# Patient Record
(demographics unavailable — no encounter records)

---

## 2024-11-12 NOTE — HISTORY OF PRESENT ILLNESS
[FreeTextEntry1] : Ms Camarena is a 58 yo female with lower back pain. Patient has a history of dull, achy, sharp thoracic and lumbar pain for two years but recently returning. Mid back pain radiates to buttock and lower extremities b/l. Patient notes heaviness of the lower extremities. The pain is worse when standing, walking, and sitting. She treats pan with Tylenol. Pt denies bowel/bladder incontinence, weakness, falls, unsteadiness.  4/6/23 Today the patient is status post (L4-5) lumbar epidural steroid injection on 3/24/23 epidural injection with significant relief and 50% improvement. The patient states the heaviness and radicular pain in legs has lessened along with her low back pain.  The patient states in the morning the pain in her low back is at its worse and rates it a 8/10 on the pain scale. She describes the pain as sharp and states the pain does radiates anterior of both thighs.  TODAY 04-: Patient presents to the office today for a follow up visit, she is most recently status post a Lumbar interlaminar (L4-5) epidural steroid injection under fluoroscopic guidance on 03/24/23 which provided her with about 60% sustained relief for about 8 months  Today she presents with continued lower/mid back pain which is intermittent in nature with symptoms worsening in the morning to both buttocks. The pain is worse when standing, walking, and sitting. She manages her pain with Tylenol. She rates her pain an 8/10 on the pain scale in the morning with symptoms improving throughout the day. She notes she cannot take anti-inflammatories due to her stomach issues. We discussed alternative treatment options; she would like to proceed with injection therapy at this time. Patient denies any bowel or bladder dysfunction, incontinence, or saddle anesthesia.  TODAY (11/12/24) revisit encounter Patient is a 60-year-old female is here accompanied by her , she was last seen in April 2024, patient states that she has not been able to come to the office because she was away at [facility] for the past 6-month. She is here today for having bilateral lower back pain that radiates into her bilateral buttock area and anterior lateral thigh patient states that the pain is worse when she walks or stands for long period of time as well as when she leans forward to pick things up or carrying heavy things.  Patient states that she was taking care of her elderly mother and she had severe pain when she was giving her mom a bath, or having to help carry her mom. Pain is 8/10.  Patient also admits to having axial back pain around the waistband, along with stiffness/pain in the lower back with any sort of rotational movements and hyperextension of the spine. Patient also notes significant pain with getting up from a seated position as well as standing or walking for extended periods of time.  I advised patient that she has a diffuse L4-5 disc herniation as well as L5-S1 disc herniation, which is most likely the cause of her pain, I advised patient against lifting heavy object or bending forward when having to lift things. Patient previously underwent an L4-L5 LESI back in March which gave her 80% relief until May, she had another one in May which gave her mild relief however the pain returned back to baseline sooner than the injection that she had in March. I discussed with the patient undergoing another epidural injection and if she continues to have pain post injection we will go forward with a lumbar MBB.  I advised patient that the lumbar MBB will only help her axial back pain but most likely not resolve her pain secondary to the disc herniation.

## 2024-11-12 NOTE — DISCUSSION/SUMMARY
[de-identified] : A discussion regarding available pain management treatment options occurred with the patient. These included interventional, rehabilitative, pharmacological, and alternative modalities. We will proceed with the following:   Interventional/ Procedures: 1.Patient will proceed with an L4-5 LESI with MAC. [I advised patient that we will repeat her epidural injection, if patient continues to have back pain we will move forward with a lumbar MBB] Treatment options were discussed with the patient. The patient has been having persistent lower back and lumbar radicular pain with minimal improvement with conservative therapies. Given that the patient has severe pain and failed conservative treatment, the patient was given the option to proceed with a lumbar epidural steroid injection to try to get some pain relief.       The risks and benefits were discussed which included bleeding, infection, nerve injury, no pain relief or worse, increased pain. All questions were answered, and concerns addressed. The patient has severe anxiety of procedures that necessitates monitored anesthesia care (MAC). The procedure performed will be close to major nerves, arteries, and spinal cord and/or joint structures. Due to the proximity of these structures, we need the patient to be still during the procedure. With the help of MAC, this will be safely achieved and decrease the risk of any complications.  Medication/pharmacological: 1. Ordered pregabalin 75 mg twice daily (patient was previously maintained on 75 mg and is requesting for refill)  - Sent a 30-day supply - We are going to start pregabalin. Potential side effects were discussed which included dizziness, sedation, and pedal edema to name a few. If the patient cannot tolerate these side effects should they occur, then they will stop the medication. If the patient experiences sedation, they understand that they should not drive. The usage of the medication was discussed and the patient understands that it is an anti-epileptic medication used for neuropathic pain and that the pain relief from this medication will likely be subtle, and that hopefully in combination with the other treatments we are offering we will be able to get some additive relief.   Rehabilitative/alternative modalities: 1. Initiate physician directed activity and lifestyle modifications. 2. Participation in active HEP was discussed and printed. 3. Patient was advised to stay away from any heavy lifting. If needed, she was advised to squat and not bend forward.    Total clinician time spent today on the patient is 30 minutes including preparing to see the patient, obtaining and/or reviewing and confirming history, performing medically necessary and appropriate examination, counseling and educating the patient and/or family, documenting clinical information in the EHR and communicating and/or referring to other healthcare professionals.  Follow-up 2 weeks postinjection  VIRGEN Garza DO

## 2024-11-12 NOTE — DATA REVIEWED
[FreeTextEntry1] : MRI of lumbar spine on 2/13/23\par  \par  x-ray of thoracic spine on 2/9/23\par  Degenerative disc disease T7-8. mild kyphosis of upper thoracic spine

## 2024-11-12 NOTE — PHYSICAL EXAM
[de-identified] : Lumbar Spine Exam:      Alignment: No spine misalignment, scoliosis, or Kyphosis  Elevated right rib hump with forward bending: (-) Elevated left rib hump with forward bending: (-)  RT/LT scapula winging (-)       Palpation: Midline lumbar tenderness: (+) Paraspinal tenderness present: Positive bilaterally Sciatic nerve tenderness  Present: Positive bilaterally SI joint tenderness Present: Positive on the right side GTB tenderness present: Negative bilaterally  Lumbar ROM  Limited ROM and pain with Flexion   Strength: Hip Flexion: R (5/5) L (5/5) Knee extension: R (5/5) L (5/5)  Knee flexion: R (5/5) L (5/5) Ankle Dorsiflexion: R (5/5) L(5/5) EHL: R (5/5) L (5/5) Ankle Plantarflexion: R (5/5) L (5/5)  Special Tests: - Positive Slump test: Positive bilaterally - Negative Facet loading/Extension Rotation positive on the right side - JUDIE test: Negative bilaterally   Neurologic: Light touch intact throughout LE Reflexes normal and symmetric  Gait: Normal gait, stable, walks without assistance. Normal toe and heel walking. No signs of foot drop, Drag and slap test (-)

## 2024-11-20 NOTE — PROCEDURE
[FreeTextEntry3] : Date of Service: 11/20/2024   Account: [id number]  Patient: CESILIA QUIROZ   YOB: 1963  Age: 60 year  Surgeon:      Gage Zepeda DO  Assistant:    None  Pre-Operative Diagnosis:         Lumbosacral Radiculopathy (M54.16)  Post Operative Diagnosis:       Lumbosacral Radiculopathy (M54.16)     Procedure:             Lumbar interlaminar (L4-5) epidural steroid injection under fluoroscopic guidance  Anesthesia:           MAC  This procedure was carried out using fluoroscopic guidance.  The risks and benefits of the procedure were discussed extensively with the patient.  The consent of the patient was obtained and the following procedure was performed. The patient was placed in the prone position on the fluoroscopy table and the area was prepped and draped in a sterile fashion.  A timeout was performed with all essential staff present and the site and side were verified.  The patient was placed in the prone position with a pillow under the abdomen to minimize the lumbar lordosis.  The lumbar area was prepped and draped in a sterile fashion.  Under A/P view with slight cephalad-caudad angulation, the L4-5 interspace was identified and marked.  Using sterile technique the superficial skin was anesthetized with 1% Lidocaine.  A 20 gauge Tuohy needle was advanced into the epidural space under fluoroscopy using loss of resistance at the L4-5 level.  After negative aspiration for heme or CSF, an epidurogram was obtained in the A/P fluoroscopic views using 2-3 cc of Omnipaque contrast confirming epidural placement of the needle.  After this, 5 cc of of a mixture of preservative free normal saline and 10 mg dexamethasone were injected into the epidural space.   The needle was subsequently removed.  Vital signs remained normal.  Pulse oximeter was used throughout the procedure and the patient's pulse and oxygen saturation remained within normal limits.  The patient tolerated the procedure well.  There were no complications.  The patient was instructed to apply ice over the injection sites for twenty minutes every two hours for the next 24 to 48 hours.  Disposition:       1. The patient was advised to F/U in 1-2 weeks to assess the response to the injection.      2. The patient was also instructed to contact me immediately if there were any concerns related to the procedure performed.

## 2025-07-01 NOTE — HISTORY OF PRESENT ILLNESS
[FreeTextEntry1] : 62 yo female with chronic conditions of HLD and back pain is a new patient here for malodorous urine. She is accompanied by her .   Patient reports 2 months ago [place] and after being treated for what she described as Giardia, her urine was very, very dark with malodor.  She states that the color of her urine is now not dark and back to normal, but it continues to have malodor.  Urinates every 2-3 hours.  States that on average she drinks 1 L water daily.  Endorses urge incontinence little drops.  Denies dysuria, gross hematuria.

## 2025-07-01 NOTE — ASSESSMENT
[FreeTextEntry1] : 60 yo female with chronic conditions of HLD and back pain is a new patient here for malodorous urine.  Ordered Ur micro and UCx to assess for UTI. Informed patient that urine is typically dark when there is not enough fluid intake.  Encouraged a daily water intake goal of 2 L. Currently, urge incontinence does not seem to be severe. Patient to monitor for worsening of urge incontinence.   Follow up in 1 month.